# Patient Record
Sex: FEMALE | Race: WHITE | HISPANIC OR LATINO | Employment: FULL TIME | URBAN - METROPOLITAN AREA
[De-identification: names, ages, dates, MRNs, and addresses within clinical notes are randomized per-mention and may not be internally consistent; named-entity substitution may affect disease eponyms.]

---

## 2023-01-07 ENCOUNTER — EXTERNAL ORDER RESULTS (OUTPATIENT)
Dept: LAB | Facility: CLINIC | Age: 38
End: 2023-01-07

## 2023-04-11 ENCOUNTER — MEDICAL CORRESPONDENCE (OUTPATIENT)
Dept: HEALTH INFORMATION MANAGEMENT | Facility: CLINIC | Age: 38
End: 2023-04-11
Payer: COMMERCIAL

## 2023-04-11 ENCOUNTER — TRANSFERRED RECORDS (OUTPATIENT)
Dept: HEALTH INFORMATION MANAGEMENT | Facility: CLINIC | Age: 38
End: 2023-04-11
Payer: COMMERCIAL

## 2023-04-14 ENCOUNTER — TRANSCRIBE ORDERS (OUTPATIENT)
Dept: OTHER | Age: 38
End: 2023-04-14

## 2023-04-14 ENCOUNTER — TELEPHONE (OUTPATIENT)
Dept: OTOLARYNGOLOGY | Facility: CLINIC | Age: 38
End: 2023-04-14
Payer: COMMERCIAL

## 2023-04-14 DIAGNOSIS — R42 MAL DE DEBARQUEMENT: Primary | ICD-10-CM

## 2023-04-14 NOTE — TELEPHONE ENCOUNTER
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

## 2023-04-17 DIAGNOSIS — R42 MAL DE DEBARQUEMENT: Primary | ICD-10-CM

## 2023-08-17 NOTE — TELEPHONE ENCOUNTER
Action Fang Jarek on 8/21/2023 at 9:11 AM   Action Taken Attempted to call Pt to ask if they have records or imaging with another facility. No answer, left VM. -CRYSTAL Balesfredis Kowalskier on 8/21/2023 at 10:17 AM Pt returned call; Stated that the only other place that they have been seen is at the [facility]. Pt stated that the do NOT have any imaging. -CRYSTAL     Records Requested     August 21, 2023 10:21 AM   [id number] [place] [address] [facility] [place]   Outcome 10:21am Sent request for relevant records to be faxed to [facility]. -CRYSTAL     RECORDS RECEIVED FROM: Care Everywhere    REASON FOR VISIT: Mal de debarquement   Date of Appt: 10/18/23 2:00pm    NOTES (FOR ALL VISITS) STATUS DETAILS   OFFICE NOTE from referring provider Internal 4/17/23 (Orders Only) Alicia Robins MD @[facility]     OFFICE NOTE from other specialist Care Everywhere 4/14/23 (Transcribed Orders), 4/11/23 (Transferred Recs) Dr. John Paul Martinez @ [facility]      DISCHARGE SUMMARY from hospital In process    DISCHARGE REPORT from the ER In process    OPERATIVE REPORT In process    [facility] ([facility] ONLY) In process    EMG In process    EEG In process    MEDICATION LIST In process    IMAGING  (FOR ALL VISITS)     MRI (HEAD, NECK, SPINE) N/A No Imaging per Pt   CT (HEAD, NECK, SPINE) N/A No Imaging per Pt

## 2023-10-18 ENCOUNTER — OFFICE VISIT (OUTPATIENT)
Dept: NEUROLOGY | Facility: CLINIC | Age: 38
End: 2023-10-18
Attending: OTOLARYNGOLOGY
Payer: COMMERCIAL

## 2023-10-18 ENCOUNTER — PRE VISIT (OUTPATIENT)
Dept: NEUROLOGY | Facility: CLINIC | Age: 38
End: 2023-10-18

## 2023-10-18 VITALS
OXYGEN SATURATION: 100 % | HEART RATE: 79 BPM | RESPIRATION RATE: 16 BRPM | SYSTOLIC BLOOD PRESSURE: 108 MMHG | DIASTOLIC BLOOD PRESSURE: 74 MMHG

## 2023-10-18 DIAGNOSIS — I87.1 COMPRESSION OF VEIN: ICD-10-CM

## 2023-10-18 DIAGNOSIS — R42 MAL DE DEBARQUEMENT: Primary | ICD-10-CM

## 2023-10-18 DIAGNOSIS — G54.0 TOS (THORACIC OUTLET SYNDROME): ICD-10-CM

## 2023-10-18 DIAGNOSIS — G24.3 CERVICAL DYSTONIA: ICD-10-CM

## 2023-10-18 PROCEDURE — 99205 OFFICE O/P NEW HI 60 MIN: CPT | Performed by: PSYCHIATRY & NEUROLOGY

## 2023-10-18 RX ORDER — DIAZEPAM 2 MG
2 TABLET ORAL
Qty: 10 TABLET | Refills: 0 | Status: SHIPPED | OUTPATIENT
Start: 2023-10-18

## 2023-10-18 ASSESSMENT — PAIN SCALES - GENERAL: PAINLEVEL: NO PAIN (0)

## 2023-10-18 NOTE — LETTER
10/18/2023       RE: Marcia Carcamo  [address]  [facility] [address]     Dear Colleague,    Thank you for referring your patient, Marcia Carcamo, to the [facility] [place] at [facility]. Please see a copy of my visit note below.    [facility]    Neurology Consult    10/18/2023      Marcia Carcamo MRN# [id number]   YOB: 1985 Age: 37 year old      Primary care provider:   No primary care provider on file.    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 Symptoms are associated with neck and shoulder tightness and imbalance. Fortunately, the episodes are self limited though they seem to be getting longer with time. We discussed the different theories of Mal de Debarquement Syndrome including a more recent venous vascular model. Myofascial compression of the veins in the neck and thoracic outlet can raise vulnerability to this process. She does have a positive right upper limb tension test. If the imaging evaluation is positive, we will initiate a physical therapy and ergonomic awareness program to help reduce the chances of a recurrence with her next exposure. She can also take a small dose of diazepam before her next travel. I advised her not to use alcohol when using diazepam. Ergonomic awareness and working on strengthening posterior neck muscles while reducing tightness in the neck and shoulders may help her develop some resilience to having a recurrence.     Recommendations:  US TOS/internal jugular vein   CT venogram head and neck after ultrasound  Physical therapy--1st rib mobilization, anterior scalene stretches, ergonomic awareness after  imaging  Diazepam 1-2mg before flying or water exposure    HISTORY OF PRESENT ILLNESS:  Marcia Carcamo is a 37 year old female with no significant past medical history who presents for evaluation of episodic rocking sensation triggered by motion.     She states that around when she was 30 years old, she took a trip to [place] and days after getting off the plane she felt like she was still on the plane. This feeling went away quickly. Since that time, every time she gets on a plane or a boat, this feeling will come back for about a month. It usually takes half a day to a day for her to start developing symptoms. She has been on the light rail without this causing the sensation. She will off balance and will feel like she is swaying back and forth. This sensation will go away completely between episodes. They have lasted longer with time but no longer than a month. It is better with driving. The worst body position is standing still, which makes her feel swaying. She feels better walking, it is less severe lying down.     She gets on a boat at least once a summer. She typically goes on the boat 3 times a summer when up at her cabin - usually from 1-4 hours. She typically takes two plane rides a year - usually [place] or [place]. She was last on a boat two months ago - she had the unsteady sensation for a month and now feels completely back to normal. She will be going to [place] sometime in January.     She has not noticed that there is a head position that consistently makes symptoms worse. She feels that every episode is a little different. Standing or sitting will consistently make the feeling worse.     When she is feeling this way she hates going to the store because she will feel like she gets tunnel vision-y in the store. The sensation goes away when she drives. She has not fallen when this happens but has had some close calls. She gets very irritable and fatigued when she has these episodes because she feels  "like she is on overdrive trying to stay balanced.     She did some exercises from YouTube which seemed to help the symptoms - these were neurology balance episodes. These exercises both shortened the duration of symptoms and improved the intensity. She would notice that she did pretty well in the morning and then symptoms would recur in the afternoon when she did these exercises. She saw a chiropractor which initially helped although did not continue to work for her. When she goes to the chiropractor when she is having an episode, they will mention that her spine is more misaligned. She has also tried acupuncture which helped once but which did not help subsequently. She has tried Dramamine which did not help.     She was seen at the [facility] who diagnosed her with Mal de Debarquement Syndrome. They had recommended Dramamine which she tried and discontinued due to lack of efficacy.     DIZZINESS:  Spinning vertigo: Never  Rocking vertigo: Yes, feeling of swaying during these episodes  Triggers: Being a boat or plane triggers symptoms that then last one month. Sitting or standing is when symptoms are the worst.   Last VNG:    AURAL SYMPTOMS:  Tinnitus: Sometimes - usually both, not associated with episodes of rocking vertigo - they happen at random, last time was this past Saturday, she suddenly heard pounding in her right ear.  Ear pressure: Never  Hearing loss: No  Last audiogram: Never     HEADACHE:  Headaches a couple times a year - doesn't take medications, usually waits them out, not functionally impairing  Aura: No aura with headaches. Rare \"squiggles\" in vision when lying in bed not associated with headaches.   Last brain imaging: Never    NECK/UPPER EXT SYMPTOMS:  Neck pain: Occasional neck pain, sees chiropractor once a month for positional discomfort - attributes to working on a computer.   Shoulder pain: No  Arm pain: No  Hand pain: No  Hand weakness: No  Numbness/tingling " hands: No  Color change hands: No  Swelling hands: pad of left second digit has been swollen and tender for the past 6 months - no injury that she is aware of. She hasn't seen a doctor for this. No other swelling in the hands.   Last EMG: Never    BULBAR SYMPTOMS:  Dysphagia: No  Throat pressure: No  Chronic cough: No    CARDIAC:  Chest pain: No  Shortness of breath: No  Palpitation: No  Syncope: No  Last Echo: Never    OTHER:  Pelvic pressure: No  Rectal pressure: No  Hematuria: No  Swelling in feet: No    Plantar fasciitis in left foot - gets better with copper brace. Bad last weekend.     She does bar class two-three times a week - still able to participate when she has the unsteady sensation, participating in the bar class does not make it any worse. She walks daily - at least 30 minutes. No MVA. Does do some weight lifting. No rowing, swimming, racket, no wires, no surgery    PAST MEDICAL HISTORY:  None    PAST SURGICAL HISTORY:  None     SOCIAL HISTORY: Single, Works in [employer].  8 hours a day working on the [employer].    ALLERGIES:  NKDA     MEDICATIONS:  No current outpatient medications on file.     PHYSICAL EXAM:  Vitals:  /74   Pulse 79   Resp 16   SpO2 100%     General: Patient is well-nourished, well-groomed, in no apparent distress    HEENT: Head is atraumatic, eyes look normal exteriorly, throat clear, neck supple. Trapezii are tight, worse on the right.   Ext: Warm, well-perfused. No edema. Reduced radial pulse right, good on left.    Neurologic:  Mental Status: Alert and oriented to person, place, time, and situation.  Able to provide an excellent history.     Cranial Nerves: Visual fields full to confrontation. Pupils equal and reactive to light.  Extraocular movements full.  Face sensation normal.  Normal head impulse testing.  Normal hearing to finger rub. Face symmetric with normal movements. Tongue and palate midline.  Normal shoulder elevation.      Motor: Normal bulk and  tone.  No pronator drift.  Normal foot taps.  Full strength to confrontational testing.    Sensory: Normal light touch, temperature, and vibration.    Reflexes: Biceps, Brachioradialis, Triceps, Knees, Ankles 3/4. Toes downgoing bilaterally    Coordination: Normal finger to nose, rapid alternating movements, heel to shin    Gait: Normal stance width.  Negative Romberg.  Good gait initiation.  Good stride length.  Good arm swing.  Normal turn. Able to walk 5 steps in tandem.      Focused Vestibular:  Positional Testing: Patient was placed in the supine, right ear down, left ear down, right head-hanging, center-hanging, and left head-hanging positions.  There was no nystagmus or vertigo elicited.    Upper Limb Tension Test for Thoracic Outlet Syndrome:  Arms abducted: Numbness and tingling develop in right hand only    Arms abducted head turned to the RIGHT:   Right hand gets mild worse   Left hand gets none  Arms abducted head turned to the LEFT:   Left hand gets none   Right hand gets most severe    Arms abducted head tilt to the RIGHT:   Right hand gets worse   Left hand gets none  Arms abducted head tilt to the LEFT:   Left hand gets none   Right hand gets none     Pain Right scalene: No  Pain Left scalene: No  Pain Right sternocleidomastoid: No  Pain Left sternocleidomastoid: No    Pain under the RIGHT pectoralis minor tendon: No  Pain at the RIGHT 1st rib-sternum insertion site: No  Pain under the LEFT pectoralis minor tendon: No  Pain at the LEFT 1st rib-sternum insertion site: No     60-minutes were spent in evaluation, examination, and documentation.          Again, thank you for allowing me to participate in the care of your patient.      Sincerely,    Shelly VALDES Cha, MD

## 2023-11-20 ENCOUNTER — ANCILLARY PROCEDURE (OUTPATIENT)
Dept: ULTRASOUND IMAGING | Facility: CLINIC | Age: 38
End: 2023-11-20
Attending: PSYCHIATRY & NEUROLOGY
Payer: COMMERCIAL

## 2023-11-20 DIAGNOSIS — R42 MAL DE DEBARQUEMENT: ICD-10-CM

## 2023-11-20 DIAGNOSIS — G54.0 TOS (THORACIC OUTLET SYNDROME): ICD-10-CM

## 2023-11-20 DIAGNOSIS — I87.1 COMPRESSION OF VEIN: ICD-10-CM

## 2023-11-20 PROCEDURE — 93922 UPR/L XTREMITY ART 2 LEVELS: CPT | Mod: GC | Performed by: RADIOLOGY

## 2023-11-20 PROCEDURE — 93970 EXTREMITY STUDY: CPT | Mod: GC | Performed by: RADIOLOGY

## 2023-11-30 DIAGNOSIS — I87.1 COMPRESSION OF VEIN: Primary | ICD-10-CM

## 2023-11-30 DIAGNOSIS — M54.2 NECK PAIN: ICD-10-CM

## 2023-11-30 DIAGNOSIS — R42 VERTIGO: ICD-10-CM

## 2023-12-22 ENCOUNTER — ANCILLARY PROCEDURE (OUTPATIENT)
Dept: CT IMAGING | Facility: CLINIC | Age: 38
End: 2023-12-22
Attending: PSYCHIATRY & NEUROLOGY
Payer: COMMERCIAL

## 2023-12-22 DIAGNOSIS — I87.1 COMPRESSION OF VEIN: ICD-10-CM

## 2023-12-22 DIAGNOSIS — M54.2 NECK PAIN: ICD-10-CM

## 2023-12-22 DIAGNOSIS — R42 VERTIGO: ICD-10-CM

## 2023-12-22 PROCEDURE — 70498 CT ANGIOGRAPHY NECK: CPT | Mod: GC | Performed by: RADIOLOGY

## 2023-12-22 PROCEDURE — 70496 CT ANGIOGRAPHY HEAD: CPT | Mod: GC | Performed by: RADIOLOGY

## 2023-12-22 RX ORDER — IOPAMIDOL 755 MG/ML
70 INJECTION, SOLUTION INTRAVASCULAR ONCE
Status: COMPLETED | OUTPATIENT
Start: 2023-12-22 | End: 2023-12-22

## 2023-12-22 RX ADMIN — IOPAMIDOL 70 ML: 755 INJECTION, SOLUTION INTRAVASCULAR at 11:07

## 2024-02-03 ENCOUNTER — HEALTH MAINTENANCE LETTER (OUTPATIENT)
Age: 39
End: 2024-02-03

## 2024-06-13 DIAGNOSIS — R42 VERTIGO: ICD-10-CM

## 2024-06-13 DIAGNOSIS — G54.0 TOS (THORACIC OUTLET SYNDROME): ICD-10-CM

## 2024-06-13 DIAGNOSIS — G24.3 CERVICAL DYSTONIA: ICD-10-CM

## 2024-06-13 DIAGNOSIS — I87.1 COMPRESSION OF VEIN: Primary | ICD-10-CM

## 2024-06-13 DIAGNOSIS — M54.2 NECK PAIN: ICD-10-CM

## 2025-03-02 ENCOUNTER — HEALTH MAINTENANCE LETTER (OUTPATIENT)
Age: 40
End: 2025-03-02

## 2025-05-06 ENCOUNTER — EXTERNAL ORDER RESULTS (OUTPATIENT)
Dept: LAB | Facility: CLINIC | Age: 40
End: 2025-05-06

## 2025-07-07 ENCOUNTER — TELEPHONE (OUTPATIENT)
Dept: NEUROLOGY | Facility: CLINIC | Age: 40
End: 2025-07-07
Payer: COMMERCIAL

## 2025-07-07 DIAGNOSIS — M43.6 CONTRACTURE OF STERNOCLEIDOMASTOID MUSCLE: ICD-10-CM

## 2025-07-07 DIAGNOSIS — G54.0 TOS (THORACIC OUTLET SYNDROME): Primary | ICD-10-CM

## 2025-07-07 DIAGNOSIS — G24.3 CERVICAL DYSTONIA: ICD-10-CM

## 2025-07-07 DIAGNOSIS — I87.1 COMPRESSION OF VEIN: ICD-10-CM

## 2025-07-07 DIAGNOSIS — M54.2 NECK PAIN: ICD-10-CM

## 2025-07-07 NOTE — TELEPHONE ENCOUNTER
[facility] Call Center    Phone Message    May a detailed message be left on voicemail: yes     Reason for Call: Order(s): Other:   Reason for requested: Patient requests a new physical therapy order  Date needed: ASAP  Provider name: Shelly Samaniego    Action Taken: [facility]    Travel Screening: Not Applicable     Date of Service:

## 2025-07-10 ENCOUNTER — THERAPY VISIT (OUTPATIENT)
Dept: PHYSICAL THERAPY | Facility: CLINIC | Age: 40
End: 2025-07-10
Attending: PSYCHIATRY & NEUROLOGY
Payer: COMMERCIAL

## 2025-07-10 DIAGNOSIS — G54.0 TOS (THORACIC OUTLET SYNDROME): ICD-10-CM

## 2025-07-10 DIAGNOSIS — M54.2 NECK PAIN: ICD-10-CM

## 2025-07-10 DIAGNOSIS — I87.1 COMPRESSION OF VEIN: ICD-10-CM

## 2025-07-10 DIAGNOSIS — G24.3 CERVICAL DYSTONIA: ICD-10-CM

## 2025-07-10 DIAGNOSIS — M43.6 CONTRACTURE OF STERNOCLEIDOMASTOID MUSCLE: ICD-10-CM

## 2025-07-10 NOTE — PROGRESS NOTES
PHYSICAL THERAPY EVALUATION  Type of Visit: Evaluation        Fall Risk Screen:  Have you fallen 2 or more times in the past year?: No  Have you fallen and had an injury in the past year?: No    Subjective         Presenting condition or subjective complaint: MDD episode  Date of onset: 06/20/25    Relevant medical history: Dizziness; Pregnant or breastfeeding   Dates & types of surgery:      Prior diagnostic imaging/testing results: CT scan     Prior therapy history for the same diagnosis, illness or injury: Yes 6/2024    Living Environment  Social support: With a significant other or spouse   Type of home: House   Stairs to enter the home: Yes 3 Is there a railing: No     Ramp: No   Stairs inside the home: Yes 30 Is there a railing: Yes     Help at home: Home management tasks (cooking, cleaning); Home and Yard maintenance tasks  Equipment owned:       Employment: Yes [employer]  Hobbies/Interests: Walking. Working out. Swimming    Patient goals for therapy: Not feel off balance    Pain assessment: See objective evaluation for additional pain details     PHYSICAL THERAPY ORTHOPEDIC EVALUATION (NECK)    SUBJECTIVE:  Marcia is a 39 year old female who reports dizziness after going on a boat with history of mal debarquement syndrome and TOS who has worked with Dr. Samaniego. She is 37.5 weeks pregnant currently.    Patient reports symptoms of: dizziness and loss of balance    Patient report of pain:  Pain Rating Now: 0/10  Pain Rating at Best: 0/10  Pain Rating at Worst: 3-4/10  Pain Location: lateral neck and upper shoulders  Pain Quality/Description: she feels off balance walking, feels she drops stuff more than usual  Pain Better with: laying down in general, vestibular based exercises on YouTube, driving in her car  Pain Worse with: shows up both with movement or sitting, walking   Progression of Symptoms: unchanged since reoccurrence  Related stressors: desk work       POSTURE:  Normal/no concerns    RANGE OF  MOTION:  Neck ROM (degrees): flexion WNL, extension 55 60 with overpressure, L rotation 84, R rotation 76, L sidebend 40, R sidebend 40      STRENGTH:  No weakness noted per discussion and C3-6 WNL    Shoulder: Shoulder flexion L 5/5, R 5/5      PALPATION & SPINE MOBILITY:  Pain with palpation at: UT, first rib area L>R, SCM, scalenes  Segmental mobility: no notable restrictions in thoracic through cervical spine      SPECIAL TESTS & SCREENINGS:  Spurling's: Negative B  Cervical flexion lateral rotation test: Negative B  Compression/decompression of cervical spine: NT  Thoracic outlet screen: - Xavier Test B; no clunk with TOS test but restricted  Shoulder screening: WNL ROM  Thoracic screening: WNL ROM        IMPRESSION/ASSESSMENT:  Patient is a 39 year old female with dizziness, neck, TOS complaints.  The following significant findings have been identified: Decreased ROM/flexibility, Impaired balance, and Dizziness. These impairments interfere with their ability to perform self care tasks, recreational activities, household chores, and community mobility as compared to previous level of function.     MEDICAL DIAGNOSIS: TOS (thoracic outlet syndrome); Contracture of sternocleidomastoid muscle  Cervical dystonia;  Neck pain; Compression of vein       PT TREATMENT DIAGNOSIS: TOS, neck pain, dizziness       Clinical Decision Making (Complexity):  Clinical Presentation: Stable/Uncomplicated  Clinical Presentation Rationale: based on medical and personal factors listed in PT evaluation  Clinical Decision Making (Complexity): Low complexity      PHYSICAL THERAPY PLAN OF CARE:  Treatment Interventions:  Modalities:   Interventions: Manual Therapy, Neuromuscular Re-education, Therapeutic Activity, Therapeutic Exercise    Long Term Goals     PT Goal 1  Goal Identifier: LTG 1  Goal Description: Patient will have no dizziness when completing kitchen work, daily cleaning, dressing and while running errands in  community  Rationale: to maximize safety and independence with performance of ADLs and functional tasks;to maximize safety and independence within the community  Goal Progress: 4/10 severity recently  Target Date: 08/21/25    Frequency of Treatment: 1x/week  Duration of Treatment: 6 visits         Risks and benefits of evaluation/treatment have been explained.   Patient/Family/caregiver agrees with Plan of Care.      Evaluation Time:     PT Eval, Low Complexity Minutes (01531): 26       Signing Clinician: Ok Bunch PT        SUMMARY OF PLAN OF CARE:  Patient's primary findings include SCM, scalene tightness as well as potential 1st rib involvement. Physical therapy will focus on neck ROM and stretching initially to reduce pain and improve function.

## 2025-07-31 LAB
% BASOPHILS (EXTERNAL): 0 %
% EOSINOPHILS (EXTERNAL): 1 %
% IMMATURE GRANULOCYTES (EXTERNAL): 1 %
% LYMPHOCYTES (EXTERNAL): 24 %
% MONOCYTES (EXTERNAL): 8 %
% NEUTROPHILS (EXTERNAL): 66 %
ABSOLUTE BASOPHILS (EXTERNAL): 0 X10E3/UL (ref 0–0.2)
ABSOLUTE EOSINOPHILS (EXTERNAL): 0.1 X10E3/UL (ref 0–0.4)
ABSOLUTE IMMATURE GRANULOCYTES (EXTERNAL): 0 X10E3/UL (ref 0–0.1)
ABSOLUTE LYMPHOCYTES (EXTERNAL): 1.8 X10E3/UL (ref 0.7–3.1)
ABSOLUTE MONOCYTES (EXTERNAL): 0.6 X10E3/UL (ref 0.1–0.9)
ABSOLUTE NEUTROPHILS (EXTERNAL): 5 X10E3/UL (ref 1.4–7)
C TRACH DNA SPEC QL PROBE+SIG AMP: NEGATIVE
ERYTHROCYTE [DISTWIDTH] IN BLOOD BY AUTOMATED COUNT: 12.3 % (ref 11.7–15.4)
HEMATOCRIT (EXTERNAL): 33 % (ref 34–46.6)
HEMOGLOBIN (EXTERNAL): 11.5 G/DL (ref 11.1–15.9)
MCH RBC QN AUTO: 31.8 PG (ref 26.6–33)
MCHC RBC AUTO-ENTMCNC: 34.8 G/DL (ref 31.5–35.7)
MCV RBC AUTO: 91 FL (ref 79–97)
N GONORRHOEA DNA SPEC QL PROBE+SIG AMP: NEGATIVE
PLATELET COUNT (EXTERNAL): 207 X10E3/UL (ref 150–450)
RAPID PLASMA REAGIN (EXTERNAL): NON REACTIVE
RBC # BLD AUTO: 3.62 X10E6/UL (ref 3.77–5.28)
URINE CULTURE OB NURSE INTERPRETATION (EXTERNAL RESULT): NO GROWTH
WBC COUNT (EXTERNAL): 7.6 X10E3/UL (ref 3.4–10.8)